# Patient Record
Sex: FEMALE | Employment: STUDENT | URBAN - METROPOLITAN AREA
[De-identification: names, ages, dates, MRNs, and addresses within clinical notes are randomized per-mention and may not be internally consistent; named-entity substitution may affect disease eponyms.]

---

## 2017-05-28 ENCOUNTER — HOSPITAL ENCOUNTER (EMERGENCY)
Facility: CLINIC | Age: 22
Discharge: HOME | End: 2017-05-28
Attending: EMERGENCY MEDICINE | Admitting: EMERGENCY MEDICINE
Payer: MEDICAID

## 2017-05-28 ENCOUNTER — APPOINTMENT (OUTPATIENT)
Dept: CT IMAGING | Facility: CLINIC | Age: 22
End: 2017-05-28
Attending: EMERGENCY MEDICINE
Payer: MEDICAID

## 2017-05-28 VITALS
DIASTOLIC BLOOD PRESSURE: 75 MMHG | WEIGHT: 157 LBS | TEMPERATURE: 99.8 F | HEART RATE: 109 BPM | OXYGEN SATURATION: 99 % | RESPIRATION RATE: 16 BRPM | SYSTOLIC BLOOD PRESSURE: 110 MMHG

## 2017-05-28 DIAGNOSIS — R59.0 CERVICAL ADENOPATHY: ICD-10-CM

## 2017-05-28 DIAGNOSIS — R32 UNSPECIFIED URINARY INCONTINENCE: ICD-10-CM

## 2017-05-28 LAB
ALBUMIN UR-MCNC: NEGATIVE MG/DL
ANION GAP SERPL CALCULATED.3IONS-SCNC: 7 MMOL/L (ref 3–14)
APPEARANCE UR: CLEAR
BASOPHILS # BLD AUTO: 0 10E9/L (ref 0–0.2)
BASOPHILS NFR BLD AUTO: 0.3 %
BILIRUB UR QL STRIP: NEGATIVE
BUN SERPL-MCNC: 10 MG/DL (ref 7–30)
CALCIUM SERPL-MCNC: 8.6 MG/DL (ref 8.5–10.1)
CHLORIDE SERPL-SCNC: 107 MMOL/L (ref 94–109)
CO2 SERPL-SCNC: 26 MMOL/L (ref 20–32)
COLOR UR AUTO: YELLOW
CREAT SERPL-MCNC: 0.74 MG/DL (ref 0.52–1.04)
CRP SERPL-MCNC: 6.7 MG/L (ref 0–8)
DEPRECATED S PYO AG THROAT QL EIA: NORMAL
DIFFERENTIAL METHOD BLD: ABNORMAL
EOSINOPHIL # BLD AUTO: 0 10E9/L (ref 0–0.7)
EOSINOPHIL NFR BLD AUTO: 0 %
ERYTHROCYTE [DISTWIDTH] IN BLOOD BY AUTOMATED COUNT: 12.2 % (ref 10–15)
ERYTHROCYTE [SEDIMENTATION RATE] IN BLOOD BY WESTERGREN METHOD: 10 MM/H (ref 0–20)
ETHANOL SERPL-MCNC: <0.01 G/DL
GFR SERPL CREATININE-BSD FRML MDRD: ABNORMAL ML/MIN/1.7M2
GLUCOSE SERPL-MCNC: 101 MG/DL (ref 70–99)
GLUCOSE UR STRIP-MCNC: NEGATIVE MG/DL
HCG SERPL QL: NEGATIVE
HCT VFR BLD AUTO: 40.1 % (ref 35–47)
HETEROPH AB SER QL: NEGATIVE
HGB BLD-MCNC: 13.7 G/DL (ref 11.7–15.7)
HGB UR QL STRIP: ABNORMAL
IMM GRANULOCYTES # BLD: 0 10E9/L (ref 0–0.4)
IMM GRANULOCYTES NFR BLD: 0.3 %
INR PPP: 1.02 (ref 0.86–1.14)
KETONES UR STRIP-MCNC: 10 MG/DL
LEUKOCYTE ESTERASE UR QL STRIP: NEGATIVE
LYMPHOCYTES # BLD AUTO: 1 10E9/L (ref 0.8–5.3)
LYMPHOCYTES NFR BLD AUTO: 28.3 %
MCH RBC QN AUTO: 30.2 PG (ref 26.5–33)
MCHC RBC AUTO-ENTMCNC: 34.2 G/DL (ref 31.5–36.5)
MCV RBC AUTO: 89 FL (ref 78–100)
MICRO REPORT STATUS: NORMAL
MONOCYTES # BLD AUTO: 0.5 10E9/L (ref 0–1.3)
MONOCYTES NFR BLD AUTO: 15.3 %
MUCOUS THREADS #/AREA URNS LPF: PRESENT /LPF
NEUTROPHILS # BLD AUTO: 2 10E9/L (ref 1.6–8.3)
NEUTROPHILS NFR BLD AUTO: 55.8 %
NITRATE UR QL: NEGATIVE
NRBC # BLD AUTO: 0 10*3/UL
NRBC BLD AUTO-RTO: 0 /100
PH UR STRIP: 5.5 PH (ref 5–7)
PLATELET # BLD AUTO: 147 10E9/L (ref 150–450)
POTASSIUM SERPL-SCNC: 3.6 MMOL/L (ref 3.4–5.3)
RBC # BLD AUTO: 4.53 10E12/L (ref 3.8–5.2)
RBC #/AREA URNS AUTO: 55 /HPF (ref 0–2)
SODIUM SERPL-SCNC: 140 MMOL/L (ref 133–144)
SP GR UR STRIP: 1.02 (ref 1–1.03)
SPECIMEN SOURCE: NORMAL
SQUAMOUS #/AREA URNS AUTO: 1 /HPF (ref 0–1)
URN SPEC COLLECT METH UR: ABNORMAL
UROBILINOGEN UR STRIP-MCNC: NORMAL MG/DL (ref 0–2)
WBC # BLD AUTO: 3.5 10E9/L (ref 4–11)
WBC #/AREA URNS AUTO: 2 /HPF (ref 0–2)

## 2017-05-28 PROCEDURE — 25000125 ZZHC RX 250: Performed by: EMERGENCY MEDICINE

## 2017-05-28 PROCEDURE — 80320 DRUG SCREEN QUANTALCOHOLS: CPT | Performed by: EMERGENCY MEDICINE

## 2017-05-28 PROCEDURE — 87880 STREP A ASSAY W/OPTIC: CPT | Performed by: EMERGENCY MEDICINE

## 2017-05-28 PROCEDURE — 25000128 H RX IP 250 OP 636: Performed by: EMERGENCY MEDICINE

## 2017-05-28 PROCEDURE — 85025 COMPLETE CBC W/AUTO DIFF WBC: CPT | Performed by: EMERGENCY MEDICINE

## 2017-05-28 PROCEDURE — 70450 CT HEAD/BRAIN W/O DYE: CPT

## 2017-05-28 PROCEDURE — 86308 HETEROPHILE ANTIBODY SCREEN: CPT | Performed by: EMERGENCY MEDICINE

## 2017-05-28 PROCEDURE — 84703 CHORIONIC GONADOTROPIN ASSAY: CPT | Performed by: EMERGENCY MEDICINE

## 2017-05-28 PROCEDURE — 51798 US URINE CAPACITY MEASURE: CPT

## 2017-05-28 PROCEDURE — 87081 CULTURE SCREEN ONLY: CPT | Performed by: EMERGENCY MEDICINE

## 2017-05-28 PROCEDURE — 96360 HYDRATION IV INFUSION INIT: CPT | Mod: 59

## 2017-05-28 PROCEDURE — 70491 CT SOFT TISSUE NECK W/DYE: CPT

## 2017-05-28 PROCEDURE — 99284 EMERGENCY DEPT VISIT MOD MDM: CPT | Mod: 25

## 2017-05-28 PROCEDURE — 96361 HYDRATE IV INFUSION ADD-ON: CPT

## 2017-05-28 PROCEDURE — 85652 RBC SED RATE AUTOMATED: CPT | Performed by: EMERGENCY MEDICINE

## 2017-05-28 PROCEDURE — 86140 C-REACTIVE PROTEIN: CPT | Performed by: EMERGENCY MEDICINE

## 2017-05-28 PROCEDURE — 80048 BASIC METABOLIC PNL TOTAL CA: CPT | Performed by: EMERGENCY MEDICINE

## 2017-05-28 PROCEDURE — 81001 URINALYSIS AUTO W/SCOPE: CPT | Performed by: EMERGENCY MEDICINE

## 2017-05-28 PROCEDURE — 85610 PROTHROMBIN TIME: CPT | Performed by: EMERGENCY MEDICINE

## 2017-05-28 PROCEDURE — 99284 EMERGENCY DEPT VISIT MOD MDM: CPT | Mod: Z6 | Performed by: EMERGENCY MEDICINE

## 2017-05-28 RX ORDER — ERYTHROMYCIN ETHYLSUCCINATE 400 MG/1
400 TABLET ORAL 3 TIMES DAILY
Qty: 30 TABLET | Refills: 0 | Status: SHIPPED | OUTPATIENT
Start: 2017-05-28 | End: 2017-06-07

## 2017-05-28 RX ORDER — SODIUM CHLORIDE 9 MG/ML
1000 INJECTION, SOLUTION INTRAVENOUS CONTINUOUS
Status: DISCONTINUED | OUTPATIENT
Start: 2017-05-28 | End: 2017-05-29 | Stop reason: HOSPADM

## 2017-05-28 RX ORDER — IOPAMIDOL 755 MG/ML
100 INJECTION, SOLUTION INTRAVASCULAR ONCE
Status: COMPLETED | OUTPATIENT
Start: 2017-05-28 | End: 2017-05-28

## 2017-05-28 RX ADMIN — SODIUM CHLORIDE 500 ML: 9 INJECTION, SOLUTION INTRAVENOUS at 20:48

## 2017-05-28 RX ADMIN — SODIUM CHLORIDE 50 ML: 9 INJECTION, SOLUTION INTRAVENOUS at 21:25

## 2017-05-28 RX ADMIN — IOPAMIDOL 80 ML: 755 INJECTION, SOLUTION INTRAVENOUS at 21:24

## 2017-05-28 ASSESSMENT — VISUAL ACUITY
OD: 20/25
OS: 20/30

## 2017-05-28 ASSESSMENT — ENCOUNTER SYMPTOMS
COUGH: 0
SHORTNESS OF BREATH: 0
VOMITING: 0
SORE THROAT: 0
DIARRHEA: 0

## 2017-05-28 NOTE — ED AVS SNAPSHOT
[facility], [place], Emergency Department    [address]    [place] [address]    Phone:  [phone]    Fax:  [phone]                                       Ping Cruz   MRN: [id number]    Department:  [facility], Emergency Department   Date of Visit:  5/28/2017           After Visit Summary Signature Page     I have received my discharge instructions, and my questions have been answered. I have discussed any challenges I see with this plan with the nurse or doctor.    ..........................................................................................................................................  Patient/Patient Representative Signature      ..........................................................................................................................................  Patient Representative Print Name and Relationship to Patient    ..................................................               ................................................  Date                                            Time    ..........................................................................................................................................  Reviewed by Signature/Title    ...................................................              ..............................................  Date                                                            Time

## 2017-05-28 NOTE — ED AVS SNAPSHOT
[facility], Emergency Department    [address]    [place] [address]    Phone:  [phone]    Fax:  [phone]                                       Ping Cruz   MRN: [id number]    Department:  [facility], Emergency Department   Date of Visit:  5/28/2017           Patient Information     Date Of Birth          1995        Your diagnoses for this visit were:     Cervical adenopathy        You were seen by Jeff Urias MD.        Discharge Instructions       Please make an appointment to follow up with [facility] (phone: [phone]) or [facility] (phone: [phone]) in 10-14 days for recheck.    Discharge References/Attachments     LYMPHADENOPATHY (ENGLISH)      24 Hour Appointment Hotline       To make an appointment at any [facility], call [phone] ([phone]). If you don't have a family doctor or clinic, we will help you find one. [facility] are conveniently located to serve the needs of you and your family.             Review of your medicines      START taking        Dose / Directions Last dose taken    erythromycin ethylsuccinate 400 MG tablet   Commonly known as:  EES   Dose:  400 mg   Quantity:  30 tablet        Take 1 tablet (400 mg) by mouth 3 times daily for 10 days   Refills:  0                Prescriptions were sent or printed at these locations (1 Prescription)                   Other Prescriptions                Printed at Department/Unit printer (1 of 1)         erythromycin ethylsuccinate (EES) 400 MG tablet                Procedures and tests performed during your visit     Alcohol ethyl    Basic metabolic panel    Beta strep group A culture    Bladder scan    CBC with platelets differential    CRP inflammation    Erythrocyte sedimentation rate auto    HCG qualitative    Head CT w/o contrast    INR    Mononucleosis screen    Peripheral IV catheter    Rapid strep screen    Soft tissue neck CT w contrast    UA with  "Microscopic reflex to Culture    Vision checks      Orders Needing Specimen Collection     None      Pending Results     Date and Time Order Name Status Description    2017 204 Beta strep group A culture In process             Pending Culture Results     Date and Time Order Name Status Description    2017 2049 Beta strep group A culture In process             Pending Results Instructions     If you had any lab results that were not finalized at the time of your Discharge, you can call the ED Lab Result RN at [phone]. You will be contacted by this team for any positive Lab results or changes in treatment. The nurses are available 7 days a week from 10A to 6:30P.  You can leave a message 24 hours per day and they will return your call.        Thank you for choosing [facility]       Thank you for choosing [facility] for your care. Our goal is always to provide you with excellent care. Hearing back from our patients is one way we can continue to improve our services. Please take a few minutes to complete the written survey that you may receive in the mail after you visit with us. Thank you!        [employer] Information     [employer] lets you send messages to your doctor, view your test results, renew your prescriptions, schedule appointments and more. To sign up, go to [email or web address] . Click on \"Log in\" on the left side of the screen, which will take you to the Welcome page. Then click on \"Sign up Now\" on the right side of the page.     You will be asked to enter the access code listed below, as well as some personal information. Please follow the directions to create your username and password.     Your access code is: [email or web address]  Expires: 2017 11:02 PM     Your access code will  in 90 days. If you need help or a new code, please call your [facility] clinic or [phone].        Care EveryWhere ID     This is your Care EveryWhere ID. This could be used by other organizations to access your " [place] medical records  [id number]        After Visit Summary       This is your record. Keep this with you and show to your community pharmacist(s) and doctor(s) at your next visit.

## 2017-05-29 NOTE — DISCHARGE INSTRUCTIONS
Please make an appointment to follow up with [facility] (phone: [phone]) or [facility] (phone: [phone]) in 10-14 days for recheck.

## 2017-05-29 NOTE — ED PROVIDER NOTES
History     Chief Complaint   Patient presents with     Lymphadenopathy     Pt reports a lump on her neck, L eye pain, and L head pain     Incontinence     Pt reports that she is urinating without being aware of it.      ISRAEL Cruz is a 21-year-old female who presents to the ER with complaints of some left-sided neck swelling that she s had over the past week associated with urinary incontinence stating that she can t feel when she urinates and she wets her pants.  Patient states she also has her current menses.  Patient denies any sore throat but complains of left-sided neck pain and pain going up the left side of her head into her left temporal area.  The patient presents here to the ER for evaluation she denies any chest pain or shortness of breath, coughing and vomiting or diarrhea.    This part of the medical record was transcribed by Indira Anderson Medical Scribe, from a dictation done by Jeff Urias MD.     PAST MEDICAL HISTORY  History reviewed. No pertinent past medical history.  PAST SURGICAL HISTORY  History reviewed. No pertinent surgical history.  FAMILY HISTORY  No family history on file.  SOCIAL HISTORY  Social History   Substance Use Topics     Smoking status: Former Smoker     Smokeless tobacco: Not on file     Alcohol use Yes      Comment: rarely     MEDICATIONS  Previous Medications    No medications on file     ALLERGIES  No Known Allergies      I have reviewed the Medications, Allergies, Past Medical and Surgical History, and Social History in the Epic system.    Review of Systems   HENT: Negative for sore throat.         Positive for left sided neck swelling   Respiratory: Negative for cough and shortness of breath.    Cardiovascular: Negative for chest pain.   Gastrointestinal: Negative for diarrhea and vomiting.   Genitourinary:        Positive for urinary incontinence   All other systems reviewed and are negative.      Physical Exam   BP: 101/85  Pulse: 109  Temp: 99.8  F  (37.7  C)  Resp: 16  Weight: 71.2 kg (157 lb)  SpO2: 96 %  Physical Exam   Constitutional: She is oriented to person, place, and time. She appears distressed (emotionally).   HENT:   Head: Atraumatic.   Mouth/Throat: Oropharynx is clear and moist. No oropharyngeal exudate.   TMs clear bilaterally   Eyes: EOM are normal. Pupils are equal, round, and reactive to light.   Neck: Neck supple.   Cardiovascular: Normal heart sounds.    Pulmonary/Chest: Breath sounds normal.   Abdominal: Soft. There is no tenderness.   Musculoskeletal: She exhibits no edema or tenderness.   Lymphadenopathy:     She has cervical adenopathy (prominent left sided diffuse).   Neurological: She is alert and oriented to person, place, and time. No cranial nerve deficit.   Grossly intact and symmetric   Skin: Skin is warm. No rash noted.   Psychiatric: She has a normal mood and affect.       ED Course     ED Course     Procedures        Results for orders placed or performed during the hospital encounter of 05/28/17   Head CT w/o contrast    Narrative    CT SCAN OF THE HEAD WITHOUT CONTRAST   5/28/2017 9:35 PM     HISTORY: Headache.    TECHNIQUE:  Axial images of the head and coronal reformations without  IV contrast material.  Radiation dose for this scan was reduced using  automated exposure control, adjustment of the mA and/or kV according  to patient size, or iterative reconstruction technique.    COMPARISON: None.    FINDINGS:  The ventricles are normal in size, shape and configuration.   The brain parenchyma and subarachnoid spaces are normal. There is no  evidence of intracranial hemorrhage, mass, acute infarct or anomaly.     The visualized portions of the sinuses and mastoids appear normal.  There is no evidence of trauma.      Impression    IMPRESSION: Normal CT scan of the head.      EVENS SEN MD   Soft tissue neck CT w contrast    Narrative    CT SCAN OF THE NECK WITH CONTRAST  5/28/2017 9:36 PM     HISTORY: Left neck  swelling.    TECHNIQUE:  Axial images and coronal reformations. Isovue 370, 80 mL  IV. Radiation dose for this scan was reduced using automated exposure  control, adjustment of the mA and/or kV according to patient size, or  iterative reconstruction technique.    COMPARISON: None.    FINDINGS: Multiple enlarged lymph nodes are seen in the left neck  involving the anterior cervical chain and posterior triangle. The  lymph nodes measure up to 1.6 cm in maximum axial dimensions. There is  no evidence for abscess. The right neck is free of lymphadenopathy.  The submandibular regions also do not show any enlarged lymph nodes.  There are few small nodular lesions within the right parotid gland and  left parotid gland which are probably normal intraparotid lymph nodes.  The pharynx, larynx, and subglottic trachea are normal. The thyroid  gland and submandibular glands are normal. Visualized orbits and  sinuses are normal. Osseous structures are unremarkable. Lung apices  are clear.      Impression    IMPRESSION: Left-sided lymphadenopathy involving the anterior cervical  chain and posterior triangle. The lymphadenopathy could be infectious,  inflammatory, granulomatous, or neoplastic. There is no evidence for  abscess.       EVENS SEN MD   CBC with platelets differential   Result Value Ref Range    WBC 3.5 (L) 4.0 - 11.0 10e9/L    RBC Count 4.53 3.8 - 5.2 10e12/L    Hemoglobin 13.7 11.7 - 15.7 g/dL    Hematocrit 40.1 35.0 - 47.0 %    MCV 89 78 - 100 fl    MCH 30.2 26.5 - 33.0 pg    MCHC 34.2 31.5 - 36.5 g/dL    RDW 12.2 10.0 - 15.0 %    Platelet Count 147 (L) 150 - 450 10e9/L    Diff Method Automated Method     % Neutrophils 55.8 %    % Lymphocytes 28.3 %    % Monocytes 15.3 %    % Eosinophils 0.0 %    % Basophils 0.3 %    % Immature Granulocytes 0.3 %    Nucleated RBCs 0 0 /100    Absolute Neutrophil 2.0 1.6 - 8.3 10e9/L    Absolute Lymphocytes 1.0 0.8 - 5.3 10e9/L    Absolute Monocytes 0.5 0.0 - 1.3 10e9/L    Absolute  Eosinophils 0.0 0.0 - 0.7 10e9/L    Absolute Basophils 0.0 0.0 - 0.2 10e9/L    Abs Immature Granulocytes 0.0 0 - 0.4 10e9/L    Absolute Nucleated RBC 0.0    Erythrocyte sedimentation rate auto   Result Value Ref Range    Sed Rate 10 0 - 20 mm/h   Mononucleosis screen   Result Value Ref Range    Mononucleosis Screen Negative NEG   CRP inflammation   Result Value Ref Range    CRP Inflammation 6.7 0.0 - 8.0 mg/L   INR   Result Value Ref Range    INR 1.02 0.86 - 1.14   Alcohol ethyl   Result Value Ref Range    Ethanol g/dL <0.01 <0.01 g/dL   Basic metabolic panel   Result Value Ref Range    Sodium 140 133 - 144 mmol/L    Potassium 3.6 3.4 - 5.3 mmol/L    Chloride 107 94 - 109 mmol/L    Carbon Dioxide 26 20 - 32 mmol/L    Anion Gap 7 3 - 14 mmol/L    Glucose 101 (H) 70 - 99 mg/dL    Urea Nitrogen 10 7 - 30 mg/dL    Creatinine 0.74 0.52 - 1.04 mg/dL    GFR Estimate >90  Non  GFR Calc   >60 mL/min/1.7m2    GFR Estimate If Black >90   GFR Calc   >60 mL/min/1.7m2    Calcium 8.6 8.5 - 10.1 mg/dL   HCG qualitative   Result Value Ref Range    HCG Qualitative Serum Negative NEG   UA with Microscopic reflex to Culture   Result Value Ref Range    Color Urine Yellow     Appearance Urine Clear     Glucose Urine Negative NEG mg/dL    Bilirubin Urine Negative NEG    Ketones Urine 10 (A) NEG mg/dL    Specific Gravity Urine 1.018 1.003 - 1.035    Blood Urine Moderate (A) NEG    pH Urine 5.5 5.0 - 7.0 pH    Protein Albumin Urine Negative NEG mg/dL    Urobilinogen mg/dL Normal 0.0 - 2.0 mg/dL    Nitrite Urine Negative NEG    Leukocyte Esterase Urine Negative NEG    Source Midstream Urine     WBC Urine 2 0 - 2 /HPF    RBC Urine 55 (H) 0 - 2 /HPF    Squamous Epithelial /HPF Urine 1 0 - 1 /HPF    Mucous Urine Present (A) NEG /LPF   Rapid strep screen   Result Value Ref Range    Specimen Description Throat     Rapid Strep A Screen       NEGATIVE: No Group A streptococcal antigen detected by immunoassay, await    culture report.      Micro Report Status FINAL 05/28/2017        Labs Ordered and Resulted from Time of ED Arrival Up to the Time of Departure from the ED   CBC WITH PLATELETS DIFFERENTIAL - Abnormal; Notable for the following:        Result Value    WBC 3.5 (*)     Platelet Count 147 (*)     All other components within normal limits   BASIC METABOLIC PANEL - Abnormal; Notable for the following:     Glucose 101 (*)     All other components within normal limits   ROUTINE UA WITH MICROSCOPIC REFLEX TO CULTURE - Abnormal; Notable for the following:     Ketones Urine 10 (*)     Blood Urine Moderate (*)     RBC Urine 55 (*)     Mucous Urine Present (*)     All other components within normal limits   ERYTHROCYTE SEDIMENTATION RATE AUTO   MONONUCLEOSIS SCREEN   CRP INFLAMMATION   INR   ALCOHOL ETHYL   HCG QUALITATIVE   PERIPHERAL IV CATHETER   BLADDER SCAN   VISION CHECKS   RAPID STREP SCREEN   BETA STREP GROUP A CULTURE       Assessments & Plan (with Medical Decision Making)     I have reviewed the nursing notes.    The patient's myriad of symptoms combined with her left cervical adenopathy into a differential diagnosis that was fairly broad and inclusive of an intracranial process, oncologic process or infectious etiology leading to her headache and incontinence.  Patient had a broad workup done which ruled out mono and had a bladder scan done that was 0.  At this time the patient most likely has a viral etiology of her cervical adenopathy but will be placed on antibiotic.  It is thought that the infectious process more than likely contused her headache as well as her incontinence at this time however this will need to be followed up on in clinic.    I have reviewed the findings, diagnosis, plan and need for follow up with the patient.    New Prescriptions    ERYTHROMYCIN ETHYLSUCCINATE (EES) 400 MG TABLET    Take 1 tablet (400 mg) by mouth 3 times daily for 10 days       Final diagnoses:   Cervical adenopathy     Please  make an appointment to follow up with [facility] (phone: [phone]) or our [facility] (phone: [phone]) in 10-14 days for recheck.    Routine discharge instructions were given for this diagnosis.    Jeff Urias MD    5/28/2017   [facility], EMERGENCY DEPARTMENT     Jeff Urias MD  05/28/17 [id number]

## 2017-05-30 LAB
BACTERIA SPEC CULT: NORMAL
MICRO REPORT STATUS: NORMAL
SPECIMEN SOURCE: NORMAL

## 2017-05-31 ENCOUNTER — NURSE TRIAGE (OUTPATIENT)
Dept: NURSING | Facility: CLINIC | Age: 22
End: 2017-05-31

## 2017-05-31 NOTE — TELEPHONE ENCOUNTER
Clinic Action Needed:Yes  Reason for Call: Grandmother calling on behalf of patient requesting results of CT scans.  Brittany Quiros RN  [facility]